# Patient Record
Sex: FEMALE | Race: OTHER | HISPANIC OR LATINO
[De-identification: names, ages, dates, MRNs, and addresses within clinical notes are randomized per-mention and may not be internally consistent; named-entity substitution may affect disease eponyms.]

---

## 2019-09-30 PROBLEM — Z00.00 ENCOUNTER FOR PREVENTIVE HEALTH EXAMINATION: Status: ACTIVE | Noted: 2019-09-30

## 2019-10-02 ENCOUNTER — APPOINTMENT (OUTPATIENT)
Dept: GASTROENTEROLOGY | Facility: CLINIC | Age: 24
End: 2019-10-02
Payer: MEDICAID

## 2019-10-02 ENCOUNTER — LABORATORY RESULT (OUTPATIENT)
Age: 24
End: 2019-10-02

## 2019-10-02 VITALS
WEIGHT: 162.7 LBS | OXYGEN SATURATION: 96 % | HEIGHT: 64 IN | DIASTOLIC BLOOD PRESSURE: 78 MMHG | SYSTOLIC BLOOD PRESSURE: 103 MMHG | HEART RATE: 75 BPM | BODY MASS INDEX: 27.78 KG/M2

## 2019-10-02 DIAGNOSIS — R74.8 ABNORMAL LEVELS OF OTHER SERUM ENZYMES: ICD-10-CM

## 2019-10-02 DIAGNOSIS — R10.84 GENERALIZED ABDOMINAL PAIN: ICD-10-CM

## 2019-10-02 DIAGNOSIS — R19.7 DIARRHEA, UNSPECIFIED: ICD-10-CM

## 2019-10-02 PROCEDURE — 36415 COLL VENOUS BLD VENIPUNCTURE: CPT

## 2019-10-02 PROCEDURE — 99204 OFFICE O/P NEW MOD 45 MIN: CPT | Mod: 25

## 2019-10-02 RX ORDER — OMEPRAZOLE 40 MG/1
40 CAPSULE, DELAYED RELEASE ORAL
Qty: 30 | Refills: 5 | Status: ACTIVE | COMMUNITY
Start: 2019-10-02 | End: 1900-01-01

## 2019-10-02 NOTE — CONSULT LETTER
[Dear  ___] : Dear  [unfilled], [Consult Letter:] : I had the pleasure of evaluating your patient, [unfilled]. [FreeTextEntry1] : Thank you very much for allowing me to participate in the care of this patient.  If you have any questions, please do not hesitate to contact me.\par \par Sincerely, \par \par Juan Herrera MD\par

## 2019-10-02 NOTE — HISTORY OF PRESENT ILLNESS
[FreeTextEntry1] : 24F schizophrenia (w/ psych hospitalizations for psychotic features), here for evaluation of 3 weeks of abdominal pain and postprandial diarrhea. Sx primarily periumbilical, with worsening after eating, relieved by BMs, which have generally been loose 3-5x/d w/o bleeding, fevers.  No recent travel/abx use/change in med.  She is unsure if lactose is a trigger.  She is here with her mother.  No wt loss.\par \par ER 9/29/19 for same: \par CMET lipase CBC: AST/ALT 47/92 (prior normal 7/2018, but AST 41 5/2018); otherwise normal labs\par UA 9/29/19 5-10 WBC, cx neg, hcg neg\par US 9/29/19: normal GB; steatosis\par \par Soc:  no tobacco or significant EtOH\par FHx: no FHx GI malignancy or IBD\par \par ROS:\par Constitutional:: no weight loss, fevers\par ENT: no deafness\par Eyes: not blind\par Neck: no LN\par Chest: no dyspnea/cough\par Cardiac: no chest pain\par Vascular: no leg swelling\par GI: no abdominal pain, nausea, vomiting, diarrhea, constipation, rectal bleeding, dysphagia, melena unless otherwise noted in HPI\par : no dysuria, dark urine\par Skin: no rashes, jaundice\par Heme: no bleeding\par \par Px: (VS noted below)\par General: NAD\par Eyes: anicteric\par Oropharynx:  clear\par Neck: no LN\par Chest: normal respiratory effort\par CVS: regular\par Abd: soft, NT, ND, +BS, no HSM\par Ext: no atrophy\par Neuro: grossly nonfocal\par

## 2019-10-02 NOTE — ASSESSMENT
[FreeTextEntry1] : 1) diarrhea/pain - possibly post-infx lactose intolerance or post-infx IBS, r/o infx, less likely IBD.  Stool studies, labs as below.  Trial PPI.  May benefit from fiber or lactose-free trial - await initial studies.\par 2) LFTs  - suspect NAFLD - serologic w/u ordered. Wt loss.\par

## 2019-10-03 LAB
ALBUMIN SERPL ELPH-MCNC: 4.7 G/DL
ALP BLD-CCNC: 84 U/L
ALT SERPL-CCNC: 73 U/L
AST SERPL-CCNC: 48 U/L
BILIRUB DIRECT SERPL-MCNC: 0.1 MG/DL
BILIRUB INDIRECT SERPL-MCNC: 0.2 MG/DL
BILIRUB SERPL-MCNC: 0.3 MG/DL
C DIFF TOX GENS STL QL NAA+PROBE: NORMAL
CDIFF BY PCR: NOT DETECTED
CERULOPLASMIN SERPL-MCNC: 30 MG/DL
ENDOMYSIUM IGA SER QL: NEGATIVE
ENDOMYSIUM IGA TITR SER: NORMAL
FERRITIN SERPL-MCNC: 71 NG/ML
HBV CORE IGM SER QL: NONREACTIVE
HBV SURFACE AB SER QL: NONREACTIVE
HBV SURFACE AG SER QL: NONREACTIVE
HCV AB SER QL: NONREACTIVE
HCV S/CO RATIO: 0.08 S/CO
HEPATITIS A IGG ANTIBODY: REACTIVE
IGA SER QL IEP: 361 MG/DL
IGG SER QL IEP: 1475 MG/DL
IRON SATN MFR SERPL: 38 %
IRON SERPL-MCNC: 118 UG/DL
PROT SERPL-MCNC: 8.1 G/DL
TIBC SERPL-MCNC: 311 UG/DL
UIBC SERPL-MCNC: 193 UG/DL

## 2019-10-04 LAB
HBV E AB SER QL: NEGATIVE
HBV E AG SER QL: NEGATIVE

## 2019-10-07 LAB
ANA SER IF-ACNC: NEGATIVE
BACTERIA STL CULT: NORMAL
GLIADIN IGA SER QL: 8.1 UNITS
GLIADIN IGG SER QL: <5 UNITS
GLIADIN PEPTIDE IGA SER-ACNC: NEGATIVE
GLIADIN PEPTIDE IGG SER-ACNC: NEGATIVE
H PYLORI AG STL QL: NOT DETECTED
LKM AB SER QL IF: <20.1 UNITS
MITOCHONDRIA AB SER IF-ACNC: NORMAL
SMOOTH MUSCLE AB SER QL IF: ABNORMAL
TTG IGA SER IA-ACNC: <1.2 U/ML
TTG IGA SER-ACNC: NEGATIVE
TTG IGG SER IA-ACNC: <1.2 U/ML
TTG IGG SER IA-ACNC: NEGATIVE

## 2019-10-11 LAB — LACTOFERRIN STL-MCNC: 40.4

## 2019-10-15 ENCOUNTER — MOBILE ON CALL (OUTPATIENT)
Age: 24
End: 2019-10-15

## 2019-10-15 LAB — DEPRECATED O AND P PREP STL: NORMAL

## 2019-10-21 ENCOUNTER — APPOINTMENT (OUTPATIENT)
Dept: GASTROENTEROLOGY | Facility: CLINIC | Age: 24
End: 2019-10-21